# Patient Record
Sex: FEMALE | Race: WHITE | NOT HISPANIC OR LATINO | Employment: STUDENT
[De-identification: names, ages, dates, MRNs, and addresses within clinical notes are randomized per-mention and may not be internally consistent; named-entity substitution may affect disease eponyms.]

---
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
## 2024-11-14 ENCOUNTER — OFFICE VISIT (OUTPATIENT)
Dept: ALLERGY | Facility: CLINIC | Age: 24
End: 2024-11-14
Payer: COMMERCIAL

## 2024-11-14 VITALS
BODY MASS INDEX: 19.8 KG/M2 | OXYGEN SATURATION: 98 % | WEIGHT: 111.77 LBS | HEART RATE: 92 BPM | DIASTOLIC BLOOD PRESSURE: 79 MMHG | SYSTOLIC BLOOD PRESSURE: 111 MMHG

## 2024-11-14 DIAGNOSIS — L20.84 INTRINSIC ECZEMA: Primary | ICD-10-CM

## 2024-11-14 DIAGNOSIS — R09.89 RUNNY NOSE: ICD-10-CM

## 2024-11-14 DIAGNOSIS — H57.9 PRURITUS OF BOTH EYES: ICD-10-CM

## 2024-11-14 DIAGNOSIS — J30.1 SEASONAL ALLERGIC RHINITIS DUE TO POLLEN: ICD-10-CM

## 2024-11-14 PROCEDURE — 95004 PERQ TESTS W/ALRGNC XTRCS: CPT | Performed by: INTERNAL MEDICINE

## 2024-11-14 PROCEDURE — 99204 OFFICE O/P NEW MOD 45 MIN: CPT | Performed by: INTERNAL MEDICINE

## 2024-11-14 NOTE — LETTER
2024    Eve Jones   2000        To Whom it May Concern;    Please excuse Eve Jones from the use of skin related products/procedures due to her underlying skin condition.  Currently evaluating to try to minimize inflammation to allow her to tolerated topical products in the future.    Sincerely,        Arya Valente MD

## 2024-11-14 NOTE — PATIENT INSTRUCTIONS
Dermatology referral  Zyrtec 20 mg at night  Consider Flonase nasal spray as needed  Pataday eyedrops as needed  Allergy shots if not improving    ECZEMA/ATOPIC DERMATITIS TREATMENT INSTRUCTIONS    Moisturizing:    This is the first and most important step.  Thick moisturizing creams or greasy ointments work best: Vanicream, Cerave, Cetaphil, Vaseline, Eucerin, Aquaphor, etc.   Apply a liberal layer to entire body at least twice a day or up to several times per day when the air is dry (as in late fall, winter, early spring)   If using steroid ointments, apply these first before applying moisturizer over the steroid ointment.    Bathing:     Bathe DAILY.   Use lukewarm water and soak for 10-20 minutes   Do not add bubble bath or bath oils/salts to the bath water   Use as little soap as possible, and only very mild/gentle soaps.  Wash dirty areas with soap at the end of the bath and quickly rinse off before getting out of the tub   Gently pat the skin dry leaving it mostly damp. Immediately apply moisturizer to the skin while it is still damp. If applying steroid cream, apply this first followed by the moisturizer.    Bleach Baths:   Use lukewarm water   If there are areas of scratched/broken skin, add 1/2 cup of bleach (chlorox, etc) to a full tub of bath water. Add 1/4 cup to a half-full tub of bath water. And add 1 tablespoon of bleach for a smaller infant tub full of water.   Soak in lukewarm bleach and water mixture for 10-15 minutes   Pat dry gently, and immediately apply moisturizer or follow your usual skin care routine while the skin is still damp    Steroid creams/ointments: will avoid at this time    Wet Wraps:    Use this intensive treatment if the eczema has a severe flare up or is more difficult to treat.   Apply your steroid cream/ointment to the area of rash   Then apply a liberal layer of moisturizer over the entire area to be wrapped.   Soak a soft cotton sock, t-shirt, or soft cloth in water. Wring  "out so it is still damp.   Wrap the damp cloth around the area being treated   Wrap a dry cloth or sock over the wet one (not too tight).   Put on long PJ's or clothing over the wraps   Leave the wrap in place for at least 30 minutes or overnight    Antihistamines:    Can help reduce itching and scratching   Zyrtec 20 mg at bedtime    Avoidance Measures:    Avoid exposure to things that make eczema worse including:   Allergens such as dust mites, animals, and pollens   Irritants such as cigarette smoke or long, hot showers or baths   Fabrics: Avoid synthetic fabrics and those that are rough or scratchy. Try to use breathable, natural fabrics such as cotton   Harsh soaps or detergents   Any skin care products that have scents/perfumes or dyes    Natural Remedies:   Some natural products can be helpful in some cases. You can use products such as coconut oil to moisturize the skin. Use a product that is \"virgin\" or \"cold pressed\" to avoid the addition of other chemicals.   Be aware that products that may be labeled as \"natural,\" \"organic,\" or \"contains essential oils\" can still be irritating to many people with eczema and may need to be avoided.     "

## 2024-11-14 NOTE — PROGRESS NOTES
Per provider verbal order, placed Adult Environmental Panel scratch test.  Verbal consent was obtained by MD prior to procedure.  Once panels were placed, patient was monitored for 15 minutes in clinic.  Provider read test after 15 minutes.  Pt tolerated procedure well.  All questions and concerns were addressed at office visit.      Krystal Babcock RN

## 2024-11-14 NOTE — LETTER
11/14/2024      Eve Jones  [address] [facility] [address]      Dear Colleague,    Thank you for referring your patient, Eve Jones, to the [facility] [place]. Please see a copy of my visit note below.    Eve Jones was seen in the Allergy Clinic at [facility].    Eve Jones is a 24 year old female being seen today in consultation for eczema.    She has problems with her skin her entire life and has seen dermatology at least a couple different occasions.  The note that I am able to review is from 2022.  She is described to me that she she had problems with topical steroids, causing a topical steroid withdrawal.  She is hesitant to use those again.    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    Her skin is quite itchy.  She is wondering if she has a food allergy.  Cats and dogs seems to make her eczema worse.  She also has a runny nose, eye itching, eye crusting as well as sneezing.    She has tried multiple different topical lotions and creams and all of them seem to make her skin worse or at least are irritating to her skin.    Past Medical History:   Diagnosis Date     Anxiety      Depression      Family History   Problem Relation Age of Onset     No Known Problems Mother      No Known Problems Father      No Known Problems Sister      No Known Problems Brother      No Known Problems Maternal Grandmother      No Known Problems Maternal Grandfather      No Known Problems Paternal Grandmother      No Known Problems Other      Past Surgical History:   Procedure Laterality Date     AS CLOSURE SUPERFICIAL, WOUND DEHIS SIMPLE      face age 6     NO HISTORY OF SURGERY       PARTIAL HYMENECTOMY/REVISION HYMENAL RING N/A 10/7/2020    Procedure: EXAM UNDER ANESTHESIA, PARTIAL HYMENECTOMY;  Surgeon: Carine Gastelum MD;   Location: [facility] OR       ENVIRONMENTAL HISTORY:   Pets inside the house include 1 cat(s).  Do you smoke cigarettes or other recreational drugs? No There is/are 0 smokers living in the house. The house does not have a damp basement.     SOCIAL HISTORY:   Eve is student. She lives with her self.      Review of Systems      Current Outpatient Medications:      amphetamine-dextroamphetamine (ADDERALL) 5 MG tablet, Take 5 mg by mouth every morning , Disp: , Rfl:      betamethasone dipropionate (DIPROSONE) 0.05 % external ointment, Apply topically 2 times daily , 2 weeks on, 2 weeks off. (Patient not taking: Reported on 11/14/2024), Disp: 50 g, Rfl: 3     guanFACINE (INTUNIV) 2 MG TB24 24 hr tablet, Take 2 mg by mouth every morning  (Patient not taking: Reported on 11/14/2024), Disp: , Rfl:      levonorgest-eth estrad 91-Day (SEASONIQUE) 0.15-0.03 &0.01 MG tablet, Take 1 tablet by mouth daily (Patient not taking: Reported on 11/14/2024), Disp: 112 tablet, Rfl: 4     sertraline (ZOLOFT) 100 MG tablet, Take 100 mg by mouth every morning  (Patient not taking: Reported on 11/14/2024), Disp: , Rfl:   No Known Allergies      EXAM:   /79   Pulse 92   Wt 50.7 kg (111 lb 12.4 oz)   SpO2 98%   BMI 19.80 kg/m      Physical Exam    Constitutional:       General: She is not in acute distress.     Appearance: Normal appearance. She is not ill-appearing.   HENT:      Head: Normocephalic and atraumatic.      Nose: Nose normal. No congestion or rhinorrhea.      Mouth/Throat:      Mouth: Mucous membranes are moist.      Pharynx: Oropharynx is clear. No posterior oropharyngeal erythema.   Eyes:      General:         Right eye: No discharge.         Left eye: No discharge.   Cardiovascular:      Rate and Rhythm: Normal rate and regular rhythm.      Heart sounds: Normal heart sounds.   Pulmonary:      Effort: Pulmonary effort is normal.      Breath sounds: Normal breath sounds. No wheezing or rhonchi.   Skin:     General: Skin is  warm.      Findings: She has an erosion on her upper back she has multiple excoriations on bilateral arms significant eczematous changes on bilateral wrists and she has facial erythema with minimal scaling  Neurological:      General: No focal deficit present.      Mental Status: She is alert. Mental status is at baseline.   Psychiatric:         Mood and Affect: Mood normal.         Behavior: Behavior normal.      WORKUP: Skin testing was positive to cat and dog and trees and grass and weeds.    ENVIRONMENTAL PERCUTANEOUS SKIN TESTING: ADULT      11/14/2024    10:00 AM   [place] [facility]   Consent Y   Ordering Physician Dr. Valente   Interpreting Physician Dr. Valente   Testing Technician Krystal BARNES, LINDSEY   Location Back   Time start: 10:03   Time End: 10:18   Positive Control: Histatrol*ALK 1 mg/ml 5/20   Negative Control: 50% Glycerin 0   Cat Hair*ALK (10,000 BAU/ml) 5/20   AP Dog Hair/Dander (1:100 w/v) 8/25   Dust Mite p. 30,000 AU/ml 0   Dust Mite f. (30,000 AU/ml) 0   Dylan (W/F in millimeters) 0   Dilshad Grass (100,000 BAU/mL) 15/30   Red [place] (W/F in millimeters) 5/15   Maple/[place] (W/F in millimeters) 0   Hackberry (W/F in millimeters) 5/15   [place] (W/F in millimeters) 0   [place] *ALK (W/F in millimeters) 0   American Elm (W/F in millimeters) 3/5   [place] (W/F in millimeters) 5/20   Black [place] (W/F in millimeters) 0   Birch Mix (W/F in millimeters) 10/30   [place] (W/F in millimeters) 0   Oak (W/F in millimeters) 8/20   Cocklebur (W/F in millimeters) 3/10   [place] (W/F in millimeters) 5/15   White Vimal (W/F in millimeters) 10/30   Careless (W/F in millimeters) 0   Nettle (W/F in millimeters) 0   English Plantain (W/F in millimeters) 0   Kochia (W/F in millimeters) 3/5   Lamb's Quarter (W/F in millimeters) 3/5   Marshelder (W/F in millimeters) 0   Ragweed Mix* ALK (W/F in millimeters) 7/30   Russian Thistle (W/F in millimeters) 0   Sagebrush/Mugwort (W/F in millimeters) 0   Sheep Sorrel  (W/F in millimeters) 0   Feather Mix* ALK (W/F in millimeters) 0   Penicillium Mix (1:10 w/v) 0   Curvularia spicifera (1:10 w/v) 0   Epicoccum (1:10 w/v) 0   Aspergillus fumigatus (1:10 w/v): 0   Alternaria tenius (1:10 w/v) 0   H. Cladosporium (1:10 w/v) 0   Phoma herbarum (1:10 w/v) 0         ASSESSMENT/PLAN:  Eve Jones is a 24 year old female seen today for eczema concerns and also has symptoms consistent with allergic rhinitis with positive skin test.  We discussed different treatment options.  Eczema care was focused on which is her primary concern.  Will also refer to dermatology due to her concerns of using topical steroids.    Dermatology referral  Zyrtec 20 mg at night  Consider Flonase nasal spray as needed  Pataday eyedrops as needed  Allergy shots if not improving  Eczema care guidelines and instructions were provided in the after visit summary.    Follow-up in 1 month      Thank you for allowing me to participate in the care of Eve Jones.      I spent 45 minutes on the date of the encounter doing chart review, history and exam, documentation and further coordination as noted above exclusive of separately reported interpretations    Arya Valente MD  Allergy/Immunology  [facility]      Per provider verbal order, placed Adult Environmental Panel scratch test.  Verbal consent was obtained by MD prior to procedure.  Once panels were placed, patient was monitored for 15 minutes in clinic.  Provider read test after 15 minutes.  Pt tolerated procedure well.  All questions and concerns were addressed at office visit.      Krystal Babcock RN      Again, thank you for allowing me to participate in the care of your patient.        Sincerely,        Arya Valente MD

## 2024-11-14 NOTE — PROGRESS NOTES
Eve Jones was seen in the Allergy Clinic at [facility].    Eve Jones is a 24 year old female being seen today in consultation for eczema.    She has problems with her skin her entire life and has seen dermatology at least a couple different occasions.  The note that I am able to review is from 2022.  She is described to me that she she had problems with topical steroids, causing a topical steroid withdrawal.  She is hesitant to use those again.    She is in cosmetology school at [employer] and would like a note so she does not have to apply some of the topical products that they are asking her to use on her own skin.    Her skin is quite itchy.  She is wondering if she has a food allergy.  Cats and dogs seems to make her eczema worse.  She also has a runny nose, eye itching, eye crusting as well as sneezing.    She has tried multiple different topical lotions and creams and all of them seem to make her skin worse or at least are irritating to her skin.    Past Medical History:   Diagnosis Date    Anxiety     Depression      Family History   Problem Relation Age of Onset    No Known Problems Mother     No Known Problems Father     No Known Problems Sister     No Known Problems Brother     No Known Problems Maternal Grandmother     No Known Problems Maternal Grandfather     No Known Problems Paternal Grandmother     No Known Problems Other      Past Surgical History:   Procedure Laterality Date    AS CLOSURE SUPERFICIAL, WOUND DEHIS SIMPLE      face age 6    NO HISTORY OF SURGERY      PARTIAL HYMENECTOMY/REVISION HYMENAL RING N/A 10/7/2020    Procedure: EXAM UNDER ANESTHESIA, PARTIAL HYMENECTOMY;  Surgeon: Carine Gastelum MD;  Location:  OR       ENVIRONMENTAL HISTORY:   Pets inside the house include 1 cat(s).  Do you smoke cigarettes or other recreational drugs? No There is/are 0 smokers living in the house. The house does not have a damp basement.     SOCIAL HISTORY:    Eve is student. She lives with her self.      Review of Systems      Current Outpatient Medications:     amphetamine-dextroamphetamine (ADDERALL) 5 MG tablet, Take 5 mg by mouth every morning , Disp: , Rfl:     betamethasone dipropionate (DIPROSONE) 0.05 % external ointment, Apply topically 2 times daily , 2 weeks on, 2 weeks off. (Patient not taking: Reported on 11/14/2024), Disp: 50 g, Rfl: 3    guanFACINE (INTUNIV) 2 MG TB24 24 hr tablet, Take 2 mg by mouth every morning  (Patient not taking: Reported on 11/14/2024), Disp: , Rfl:     levonorgest-eth estrad 91-Day (SEASONIQUE) 0.15-0.03 &0.01 MG tablet, Take 1 tablet by mouth daily (Patient not taking: Reported on 11/14/2024), Disp: 112 tablet, Rfl: 4    sertraline (ZOLOFT) 100 MG tablet, Take 100 mg by mouth every morning  (Patient not taking: Reported on 11/14/2024), Disp: , Rfl:   No Known Allergies      EXAM:   /79   Pulse 92   Wt 50.7 kg (111 lb 12.4 oz)   SpO2 98%   BMI 19.80 kg/m      Physical Exam    Constitutional:       General: She is not in acute distress.     Appearance: Normal appearance. She is not ill-appearing.   HENT:      Head: Normocephalic and atraumatic.      Nose: Nose normal. No congestion or rhinorrhea.      Mouth/Throat:      Mouth: Mucous membranes are moist.      Pharynx: Oropharynx is clear. No posterior oropharyngeal erythema.   Eyes:      General:         Right eye: No discharge.         Left eye: No discharge.   Cardiovascular:      Rate and Rhythm: Normal rate and regular rhythm.      Heart sounds: Normal heart sounds.   Pulmonary:      Effort: Pulmonary effort is normal.      Breath sounds: Normal breath sounds. No wheezing or rhonchi.   Skin:     General: Skin is warm.      Findings: She has an erosion on her upper back she has multiple excoriations on bilateral arms significant eczematous changes on bilateral wrists and she has facial erythema with minimal scaling  Neurological:      General: No focal deficit  present.      Mental Status: She is alert. Mental status is at baseline.   Psychiatric:         Mood and Affect: Mood normal.         Behavior: Behavior normal.      WORKUP: Skin testing was positive to cat and dog and trees and grass and weeds.    ENVIRONMENTAL PERCUTANEOUS SKIN TESTING: ADULT      11/14/2024    10:00 AM   [place] [facility]   Consent Y   Ordering Physician Dr. Valente   Interpreting Physician Dr. Valente   Testing Technician Krystal BARNES RN   Location Back   Time start: 10:03   Time End: 10:18   Positive Control: Histatrol*ALK 1 mg/ml 5/20   Negative Control: 50% Glycerin 0   Cat Hair*ALK (10,000 BAU/ml) 5/20   AP Dog Hair/Dander (1:100 w/v) 8/25   Dust Mite p. 30,000 AU/ml 0   Dust Mite f. (30,000 AU/ml) 0   Dylan (W/F in millimeters) 0   Dilshad Grass (100,000 BAU/mL) 15/30   Red [place] (W/F in millimeters) 5/15   Maple/[place] (W/F in millimeters) 0   [place]berry (W/F in millimeters) 5/15   [place] (W/F in millimeters) 0   [place] *ALK (W/F in millimeters) 0   American Elm (W/F in millimeters) 3/5   [place] (W/F in millimeters) 5/20   Black [place] (W/F in millimeters) 0   Birch Mix (W/F in millimeters) 10/30   [place] (W/F in millimeters) 0   Oak (W/F in millimeters) 8/20   Cocklebur (W/F in millimeters) 3/10   [place] (W/F in millimeters) 5/15   White Vimal (W/F in millimeters) 10/30   Careless (W/F in millimeters) 0   Nettle (W/F in millimeters) 0   English Plantain (W/F in millimeters) 0   Kochia (W/F in millimeters) 3/5   Lamb's Quarter (W/F in millimeters) 3/5   Marshelder (W/F in millimeters) 0   Ragweed Mix* ALK (W/F in millimeters) 7/30   Russian Thistle (W/F in millimeters) 0   Sagebrush/Mugwort (W/F in millimeters) 0   Sheep Sorrel (W/F in millimeters) 0   Feather Mix* ALK (W/F in millimeters) 0   Penicillium Mix (1:10 w/v) 0   Curvularia spicifera (1:10 w/v) 0   Epicoccum (1:10 w/v) 0   Aspergillus fumigatus (1:10 w/v): 0   Alternaria tenius (1:10 w/v) 0   H. Cladosporium (1:10  w/v) 0   Phoma herbarum (1:10 w/v) 0         ASSESSMENT/PLAN:  Eve Jones is a 24 year old female seen today for eczema concerns and also has symptoms consistent with allergic rhinitis with positive skin test.  We discussed different treatment options.  Eczema care was focused on which is her primary concern.  Will also refer to dermatology due to her concerns of using topical steroids.    Dermatology referral  Zyrtec 20 mg at night  Consider Flonase nasal spray as needed  Pataday eyedrops as needed  Allergy shots if not improving  Eczema care guidelines and instructions were provided in the after visit summary.    Follow-up in 1 month      Thank you for allowing me to participate in the care of Eve Jones.      I spent 45 minutes on the date of the encounter doing chart review, history and exam, documentation and further coordination as noted above exclusive of separately reported interpretations    Arya Valente MD  Allergy/Immunology  [facility]

## 2025-01-02 ENCOUNTER — OFFICE VISIT (OUTPATIENT)
Dept: ALLERGY | Facility: CLINIC | Age: 25
End: 2025-01-02
Attending: INTERNAL MEDICINE
Payer: COMMERCIAL

## 2025-01-02 VITALS
BODY MASS INDEX: 19.8 KG/M2 | HEART RATE: 118 BPM | DIASTOLIC BLOOD PRESSURE: 73 MMHG | SYSTOLIC BLOOD PRESSURE: 103 MMHG | WEIGHT: 111.77 LBS | OXYGEN SATURATION: 100 %

## 2025-01-02 DIAGNOSIS — R09.89 RUNNY NOSE: ICD-10-CM

## 2025-01-02 DIAGNOSIS — J30.1 SEASONAL ALLERGIC RHINITIS DUE TO POLLEN: ICD-10-CM

## 2025-01-02 DIAGNOSIS — L20.84 INTRINSIC ECZEMA: ICD-10-CM

## 2025-01-02 DIAGNOSIS — H57.9 PRURITUS OF BOTH EYES: ICD-10-CM

## 2025-01-02 RX ORDER — PIMECROLIMUS 10 MG/G
CREAM TOPICAL 2 TIMES DAILY
Qty: 100 G | Refills: 5 | Status: SHIPPED | OUTPATIENT
Start: 2025-01-02

## 2025-01-02 RX ORDER — FEXOFENADINE HCL 60 MG/1
60 TABLET, FILM COATED ORAL 2 TIMES DAILY
COMMUNITY

## 2025-01-02 NOTE — PROGRESS NOTES
Eve Jones was seen in the Allergy Clinic at [facility].    Eve Jones is a 24 year old female being seen today for ongoing evaluation of Intrinsic eczema.    Since the last visit the patient has been having feeling better.     Since the weather has cooled off her skin is significantly improved.  At the last appointment testing did reveal a cat, dog, tree, grass and weed allergy.  Skin testing was performed.    She switch Zyrtec to Allegra.  Not using the Flonase or Pataday at this time.  We also provided an eczema care handout at the last appointment.  She does have an upcoming dermatology appointment in April.    PAST ALLERGY HISTORY:    She has problems with her skin her entire life and has seen dermatology in the past, last I see is 2022.  She she had problems with topical steroids, causing a topical steroid withdrawal.  She is hesitant to use those again.    She is in cosmetology school at [employer] and would like a note so she does not have to apply some of the topical products, which aggravate her skin.    Her skin is quite itchy.  Cats and dogs seems to make her eczema worse.  She also has a runny nose, eye itching, eye crusting as well as sneezing.    She has tried multiple different topical lotions and creams and all of them seem to make her skin worse or at least are irritating to her skin.    Past Medical History:   Diagnosis Date    Anxiety     Depression      Family History   Problem Relation Age of Onset    No Known Problems Mother     No Known Problems Father     No Known Problems Sister     No Known Problems Brother     No Known Problems Maternal Grandmother     No Known Problems Maternal Grandfather     No Known Problems Paternal Grandmother     No Known Problems Other      Past Surgical History:   Procedure Laterality Date    AS CLOSURE SUPERFICIAL, WOUND DEHIS SIMPLE      face age 6    NO HISTORY OF SURGERY      PARTIAL HYMENECTOMY/REVISION HYMENAL RING N/A 10/7/2020     Procedure: EXAM UNDER ANESTHESIA, PARTIAL HYMENECTOMY;  Surgeon: Carine Gastelum MD;  Location:  OR         Current Outpatient Medications:     amphetamine-dextroamphetamine (ADDERALL) 5 MG tablet, Take 5 mg by mouth every morning , Disp: , Rfl:     fexofenadine (ALLEGRA) 60 MG tablet, Take 60 mg by mouth 2 times daily., Disp: , Rfl:     pimecrolimus (ELIDEL) 1 % external cream, Apply topically 2 times daily., Disp: 100 g, Rfl: 5    betamethasone dipropionate (DIPROSONE) 0.05 % external ointment, Apply topically 2 times daily , 2 weeks on, 2 weeks off. (Patient not taking: Reported on 1/2/2025), Disp: 50 g, Rfl: 3    guanFACINE (INTUNIV) 2 MG TB24 24 hr tablet, Take 2 mg by mouth every morning  (Patient not taking: Reported on 1/2/2025), Disp: , Rfl:     levonorgest-eth estrad 91-Day (SEASONIQUE) 0.15-0.03 &0.01 MG tablet, Take 1 tablet by mouth daily (Patient not taking: Reported on 1/2/2025), Disp: 112 tablet, Rfl: 4    sertraline (ZOLOFT) 100 MG tablet, Take 100 mg by mouth every morning  (Patient not taking: Reported on 1/2/2025), Disp: , Rfl:   No Known Allergies      EXAM:   /73   Pulse 118   Wt 50.7 kg (111 lb 12.4 oz)   SpO2 100%   BMI 19.80 kg/m      Constitutional:       General: She is not in acute distress.     Appearance: Normal appearance. She is not ill-appearing.   HENT:      Head: Normocephalic and atraumatic.     Eyes:      General:         Right eye: No discharge.         Left eye: No discharge.   Skin:     Findings: She does have some lichenification and erythema bilateral wrist.  Near her nasolabial fold she does have some slight scaling without any erythema  Neurological:      General: No focal deficit present.      Mental Status: She is alert. Mental status is at baseline.   Psychiatric:         Mood and Affect: Mood normal.         Behavior: Behavior normal.        ASSESSMENT/PLAN:  Eve Jones is a 24 year old female seen today for ongoing evaluation of eczema.   She will be seeing dermatology in April.  We discussed different topical options such as a Sunil 2 inhibitor, calcineurin inhibitor, or Eucrisa.    Will start with the topical calcineurin inhibitor, Elidel.    We did discuss allergy shots today which will not be started which would be primarily for her allergy symptoms, but may help her eczema.    Her skin is doing well at this time.    Follow-up in May.      Thank you for allowing me to participate in the care of Eve GUS Karen.      I spent 30 minutes on the date of the encounter doing chart review, history and exam, documentation and further coordination as noted above exclusive of separately reported interpretations    Arya Valente MD  Allergy/Immunology  [facility]

## 2025-01-02 NOTE — LETTER
1/2/2025      Eve Jones  [address]  [facility] [address]      Dear Colleague,    Thank you for referring your patient, Eve Jones, to the [facility] [place]. Please see a copy of my visit note below.    Eve Jones was seen in the Allergy Clinic at [facility].    Eve Jones is a 24 year old female being seen today for ongoing evaluation of Intrinsic eczema.    Since the last visit the patient has been having feeling better.     Since the weather has cooled off her skin is significantly improved.  At the last appointment testing did reveal a cat, dog, tree, grass and weed allergy.  Skin testing was performed.    She switch Zyrtec to Allegra.  Not using the Flonase or Pataday at this time.  We also provided an eczema care handout at the last appointment.  She does have an upcoming dermatology appointment in April.    PAST ALLERGY HISTORY:    She has problems with her skin her entire life and has seen dermatology in the past, last I see is 2022.  She she had problems with topical steroids, causing a topical steroid withdrawal.  She is hesitant to use those again.    She is in cosmetology school at [employer] and would like a note so she does not have to apply some of the topical products, which aggravate her skin.    Her skin is quite itchy.  Cats and dogs seems to make her eczema worse.  She also has a runny nose, eye itching, eye crusting as well as sneezing.    She has tried multiple different topical lotions and creams and all of them seem to make her skin worse or at least are irritating to her skin.    Past Medical History:   Diagnosis Date     Anxiety      Depression      Family History   Problem Relation Age of Onset     No Known Problems Mother      No Known Problems Father      No Known Problems Sister      No Known Problems Brother      No Known Problems Maternal Grandmother      No Known Problems Maternal Grandfather      No Known Problems  Paternal Grandmother      No Known Problems Other      Past Surgical History:   Procedure Laterality Date     AS CLOSURE SUPERFICIAL, WOUND DEHIS SIMPLE      face age 6     NO HISTORY OF SURGERY       PARTIAL HYMENECTOMY/REVISION HYMENAL RING N/A 10/7/2020    Procedure: EXAM UNDER ANESTHESIA, PARTIAL HYMENECTOMY;  Surgeon: Carine Gastelum MD;  Location:  OR         Current Outpatient Medications:      amphetamine-dextroamphetamine (ADDERALL) 5 MG tablet, Take 5 mg by mouth every morning , Disp: , Rfl:      fexofenadine (ALLEGRA) 60 MG tablet, Take 60 mg by mouth 2 times daily., Disp: , Rfl:      pimecrolimus (ELIDEL) 1 % external cream, Apply topically 2 times daily., Disp: 100 g, Rfl: 5     betamethasone dipropionate (DIPROSONE) 0.05 % external ointment, Apply topically 2 times daily , 2 weeks on, 2 weeks off. (Patient not taking: Reported on 1/2/2025), Disp: 50 g, Rfl: 3     guanFACINE (INTUNIV) 2 MG TB24 24 hr tablet, Take 2 mg by mouth every morning  (Patient not taking: Reported on 1/2/2025), Disp: , Rfl:      levonorgest-eth estrad 91-Day (SEASONIQUE) 0.15-0.03 &0.01 MG tablet, Take 1 tablet by mouth daily (Patient not taking: Reported on 1/2/2025), Disp: 112 tablet, Rfl: 4     sertraline (ZOLOFT) 100 MG tablet, Take 100 mg by mouth every morning  (Patient not taking: Reported on 1/2/2025), Disp: , Rfl:   No Known Allergies      EXAM:   /73   Pulse 118   Wt 50.7 kg (111 lb 12.4 oz)   SpO2 100%   BMI 19.80 kg/m      Constitutional:       General: She is not in acute distress.     Appearance: Normal appearance. She is not ill-appearing.   HENT:      Head: Normocephalic and atraumatic.     Eyes:      General:         Right eye: No discharge.         Left eye: No discharge.   Skin:     Findings: She does have some lichenification and erythema bilateral wrist.  Near her nasolabial fold she does have some slight scaling without any erythema  Neurological:      General: No focal deficit  present.      Mental Status: She is alert. Mental status is at baseline.   Psychiatric:         Mood and Affect: Mood normal.         Behavior: Behavior normal.        ASSESSMENT/PLAN:  Eve Jones is a 24 year old female seen today for ongoing evaluation of eczema.  She will be seeing dermatology in April.  We discussed different topical options such as a Usnil 2 inhibitor, calcineurin inhibitor, or Eucrisa.    Will start with the topical calcineurin inhibitor, Elidel.    We did discuss allergy shots today which will not be started which would be primarily for her allergy symptoms, but may help her eczema.    Her skin is doing well at this time.    Follow-up in May.      Thank you for allowing me to participate in the care of Eve Jones.      I spent 30 minutes on the date of the encounter doing chart review, history and exam, documentation and further coordination as noted above exclusive of separately reported interpretations    Arya Valente MD  Allergy/Immunology  [facility]      Again, thank you for allowing me to participate in the care of your patient.        Sincerely,        Arya Valente MD    Electronically signed

## 2025-04-17 ENCOUNTER — OFFICE VISIT (OUTPATIENT)
Dept: DERMATOLOGY | Facility: CLINIC | Age: 25
End: 2025-04-17
Attending: PHYSICIAN ASSISTANT
Payer: COMMERCIAL

## 2025-04-17 DIAGNOSIS — L20.84 INTRINSIC ECZEMA: Primary | ICD-10-CM

## 2025-04-17 DIAGNOSIS — L20.89 OTHER ATOPIC DERMATITIS: ICD-10-CM

## 2025-04-17 RX ORDER — PIMECROLIMUS 10 MG/G
CREAM TOPICAL 2 TIMES DAILY
Qty: 100 G | Refills: 5 | Status: SHIPPED | OUTPATIENT
Start: 2025-04-17

## 2025-04-17 ASSESSMENT — PAIN SCALES - GENERAL: PAINLEVEL_OUTOF10: NO PAIN (0)

## 2025-04-17 NOTE — LETTER
4/17/2025      Eve Jones  [address]  [facility] [address]      Dear Colleague,    Thank you for referring your patient, Eve Jones, to the [facility] [place]. Please see a copy of my visit note below.    [facility] Dermatology Note  Encounter Date: Apr 17, 2025  Office Visit      Dermatology Problem List:    Atopic dermatitis  - Tx: Dupixent. & Elidel Cream     PMHx: allergic rhinitis - numerous food allergies, cat/dog allergy, tree and grass allergy  Social: adopted from [place] as a baby  ____________________________________________    Assessment & Plan:  # Atopic Dermatitis, chronic, flaring. Will start Dupixent given chronicity and lack of improvement to various topicals over time.   - Discussed the etiology of this condition as well as treatment and their side effects  - Discussed benefits and risks of medication including but not limited to risk of Headache, HSV infection, oral herpes, allergic reaction, local injection site reaction, nasopharyngitis/URI/bronchitis,  (cold symptoms) conjunctivitis, diarrhea, atopic derm worsening, flu, back pain, eye pruritus and/or case reports/rare cases of: Eosinophilia (transient), hypersensitivity reaction, keratitis, serum sickness, serum sickness-like reaction, ligament rupture, SCC,  urticaria, xerophthalmia, lymphoma, prostate cancer.  The patient was counseled to hold dose if he/she feels ill and to contact clinic. Vaccinations reviewed. The patient will not get live vaccination. Flu vaccination recommended.  The patient denies history of hypersensitivity of dupilumab.   - Patient is not currently pregnant or breastfeeding.   - Before injection, remove dupilumab pre-filled syringe from the refrigerator and allow dupilumab to reach room temperature (45 minutes) without removing the needle cap.   - Start Dupilumab 600 mg once (given as two 300 mg injections), followed by 300 mg once every other week.   -  Refilled her Elidel cream. Ok to use BID to flaring patches. Reviewed black box warning.   - Please start a daily moisturizer to your skin. Some good examples are: Cerave Cetaphil, Vanicream, Vaseline, Aquaphor. Recommended products IN A JAR. It is best to put it on after you get out of the shower.  - Follow up in 6 months to check for any abnormalities.     Procedures Performed:   None     Follow-up: 6 month(s) in-person, or earlier for new or changing lesions    Staff and scribe:     Scribe Disclosure:   I, ADARSH SEGOVIA, am serving as a scribe; to document services personally performed by Aileen Billy PA-C -based on data collection and the provider's statements to me.     Provider Disclosure:  I agree with above History, Review of Systems, Physical exam and Plan.  I have reviewed the content of the documentation and have edited it as needed. I have personally performed the services documented here and the documentation accurately represents those services and the decisions I have made.      Electronically signed by:    All risks, benefits and alternatives were discussed with patient.  Patient is in agreement and understands the assessment and plan.  All questions were answered.    Aileen Billy PA-C, MPAS  [facility] [place]: Phone: [phone], Fax: [phone]  [facility]: Phone: [phone],  Fax: [phone]  [facility]: Phone: [phone], Fax: [phone]  ____________________________________________    CC: Eczema (Dry sensitive skin)      Reviewed patients past medical history and pertinent chart review prior to patient's visit today.     HPI:  Ms. Eve Jones is a 24 year old female who presents today as a new patient for dermatitis consultation. Noted she has had eczema her whole life. In the past she has used Elidel cream and betamethasone. Has noticed her skin is dry, itchy, and  sensitive. Patient has really struggled with this. Reports it has dramatically affected her life. She does not do the same things as her peers as she knows her skin will react and she will be miserable for long periods of time. It has affected her mental and social health. Itchy. Has hx of allergic rhinitis as well. Numerous allergies to foods as well as dogs and cats and environmental allergies.     Patient is otherwise feeling well, without additional concerns.    Labs:  N/A    Physical Exam:  Vitals: There were no vitals taken for this visit.  SKIN: Focused examination of areas noted below was performed.   - There are pink scaly patches and plaques on the antecubital fossae, neck.  - No other lesions of concern on areas examined.     Medications:  Current Outpatient Medications   Medication Sig Dispense Refill     amphetamine-dextroamphetamine (ADDERALL) 5 MG tablet Take 5 mg by mouth every morning        fexofenadine (ALLEGRA) 60 MG tablet Take 60 mg by mouth 2 times daily.       betamethasone dipropionate (DIPROSONE) 0.05 % external ointment Apply topically 2 times daily , 2 weeks on, 2 weeks off. (Patient not taking: Reported on 11/14/2024) 50 g 3     guanFACINE (INTUNIV) 2 MG TB24 24 hr tablet Take 2 mg by mouth every morning  (Patient not taking: Reported on 11/14/2024)       levonorgest-eth estrad 91-Day (SEASONIQUE) 0.15-0.03 &0.01 MG tablet Take 1 tablet by mouth daily (Patient not taking: Reported on 11/14/2024) 112 tablet 4     pimecrolimus (ELIDEL) 1 % external cream Apply topically 2 times daily. (Patient not taking: Reported on 4/17/2025) 100 g 5     sertraline (ZOLOFT) 100 MG tablet Take 100 mg by mouth every morning  (Patient not taking: Reported on 11/14/2024)       No current facility-administered medications for this visit.      Past Medical/Surgical History:   Patient Active Problem List   Diagnosis     ADHD (attention deficit hyperactivity disorder)     Eczema     intl adoption from [place] age  14 mo     Hymen abnormality     Past Medical History:   Diagnosis Date     Anxiety      Depression                           Again, thank you for allowing me to participate in the care of your patient.        Sincerely,        Aileen Billy PA-C    Electronically signed

## 2025-04-17 NOTE — PROGRESS NOTES
[facility] Dermatology Note  Encounter Date: Apr 17, 2025  Office Visit      Dermatology Problem List:    Atopic dermatitis  - Tx: Dupixent. & Elidel Cream     PMHx: allergic rhinitis - numerous food allergies, cat/dog allergy, tree and grass allergy  Social: adopted from [place] as a baby  ____________________________________________    Assessment & Plan:  # Atopic Dermatitis, chronic, flaring. Will start Dupixent given chronicity and lack of improvement to various topicals over time.   - Discussed the etiology of this condition as well as treatment and their side effects  - Discussed benefits and risks of medication including but not limited to risk of Headache, HSV infection, oral herpes, allergic reaction, local injection site reaction, nasopharyngitis/URI/bronchitis,  (cold symptoms) conjunctivitis, diarrhea, atopic derm worsening, flu, back pain, eye pruritus and/or case reports/rare cases of: Eosinophilia (transient), hypersensitivity reaction, keratitis, serum sickness, serum sickness-like reaction, ligament rupture, SCC,  urticaria, xerophthalmia, lymphoma, prostate cancer.  The patient was counseled to hold dose if he/she feels ill and to contact clinic. Vaccinations reviewed. The patient will not get live vaccination. Flu vaccination recommended.  The patient denies history of hypersensitivity of dupilumab.   - Patient is not currently pregnant or breastfeeding.   - Before injection, remove dupilumab pre-filled syringe from the refrigerator and allow dupilumab to reach room temperature (45 minutes) without removing the needle cap.   - Start Dupilumab 600 mg once (given as two 300 mg injections), followed by 300 mg once every other week.   - Refilled her Elidel cream. Ok to use BID to flaring patches. Reviewed black box warning.   - Please start a daily moisturizer to your skin. Some good examples are: Cerave Cetaphil, Vanicream, Vaseline, Aquaphor. Recommended products IN A JAR. It is  best to put it on after you get out of the shower.  - Follow up in 6 months to check for any abnormalities.     Procedures Performed:   None     Follow-up: 6 month(s) in-person, or earlier for new or changing lesions    Staff and scribe:     Scribe Disclosure:   I, ADARSH JULIETTE, am serving as a scribe; to document services personally performed by Aileen Billy PA-C -based on data collection and the provider's statements to me.     Provider Disclosure:  I agree with above History, Review of Systems, Physical exam and Plan.  I have reviewed the content of the documentation and have edited it as needed. I have personally performed the services documented here and the documentation accurately represents those services and the decisions I have made.      Electronically signed by:    All risks, benefits and alternatives were discussed with patient.  Patient is in agreement and understands the assessment and plan.  All questions were answered.    Aileen Billy PA-C, MPAS  [facility] [place]: Phone: [phone], Fax: [phone]  [facility]: Phone: [phone],  Fax: [phone]  [facility]: Phone: [phone], Fax: [phone]  ____________________________________________    CC: Eczema (Dry sensitive skin)      Reviewed patients past medical history and pertinent chart review prior to patient's visit today.     HPI:  Ms. Eve Jones is a 24 year old female who presents today as a new patient for dermatitis consultation. Noted she has had eczema her whole life. In the past she has used Elidel cream and betamethasone. Has noticed her skin is dry, itchy, and sensitive. Patient has really struggled with this. Reports it has dramatically affected her life. She does not do the same things as her peers as she knows her skin will react and she will be miserable for long periods of time. It has affected her mental  and social health. Itchy. Has hx of allergic rhinitis as well. Numerous allergies to foods as well as dogs and cats and environmental allergies.     Patient is otherwise feeling well, without additional concerns.    Labs:  N/A    Physical Exam:  Vitals: There were no vitals taken for this visit.  SKIN: Focused examination of areas noted below was performed.   - There are pink scaly patches and plaques on the antecubital fossae, neck.  - No other lesions of concern on areas examined.     Medications:  Current Outpatient Medications   Medication Sig Dispense Refill    amphetamine-dextroamphetamine (ADDERALL) 5 MG tablet Take 5 mg by mouth every morning       fexofenadine (ALLEGRA) 60 MG tablet Take 60 mg by mouth 2 times daily.      betamethasone dipropionate (DIPROSONE) 0.05 % external ointment Apply topically 2 times daily , 2 weeks on, 2 weeks off. (Patient not taking: Reported on 11/14/2024) 50 g 3    guanFACINE (INTUNIV) 2 MG TB24 24 hr tablet Take 2 mg by mouth every morning  (Patient not taking: Reported on 11/14/2024)      levonorgest-eth estrad 91-Day (SEASONIQUE) 0.15-0.03 &0.01 MG tablet Take 1 tablet by mouth daily (Patient not taking: Reported on 11/14/2024) 112 tablet 4    pimecrolimus (ELIDEL) 1 % external cream Apply topically 2 times daily. (Patient not taking: Reported on 4/17/2025) 100 g 5    sertraline (ZOLOFT) 100 MG tablet Take 100 mg by mouth every morning  (Patient not taking: Reported on 11/14/2024)       No current facility-administered medications for this visit.      Past Medical/Surgical History:   Patient Active Problem List   Diagnosis    ADHD (attention deficit hyperactivity disorder)    Eczema    intl adoption from [place] age 14 mo    Hymen abnormality     Past Medical History:   Diagnosis Date    Anxiety     Depression

## 2025-04-28 ENCOUNTER — VIRTUAL VISIT (OUTPATIENT)
Dept: PHARMACY | Facility: CLINIC | Age: 25
End: 2025-04-28
Attending: PHYSICIAN ASSISTANT
Payer: COMMERCIAL

## 2025-04-28 DIAGNOSIS — L30.9 ECZEMA: Primary | ICD-10-CM

## 2025-04-28 DIAGNOSIS — F90.9 ADHD (ATTENTION DEFICIT HYPERACTIVITY DISORDER): ICD-10-CM

## 2025-04-28 DIAGNOSIS — F32.A DEPRESSION: ICD-10-CM

## 2025-04-28 DIAGNOSIS — F41.9 ANXIETY: ICD-10-CM

## 2025-04-28 RX ORDER — BUPROPION HYDROCHLORIDE 150 MG/1
150 TABLET ORAL EVERY MORNING
COMMUNITY
Start: 2024-06-06

## 2025-04-28 NOTE — PROGRESS NOTES
Medication Therapy Management (MTM) Encounter    ASSESSMENT:                            Medication Adherence/Access: No issues identified.    Atopic dermatitis: Provided education on Dupixent (dupilumab) today including dosing, general administration, side effects (both common/serious), precautions, monitoring and time to efficacy. Encouraged patient to contact the Dermatology clinic in the event they have questions. Would benefit from starting Dupixent once it arrives.    ADHD, Depression, Anxiety: Defer to specialist (psych) managing this condition.         PLAN:                            Patient to start Dupixent.  Consider receiving the following vaccination(s): COVID-19 booster and tetanus booster (Tdap or similar).    Follow-up: Allergy 5/2/25; [facility] 8/18/25; MTM 7/28/25     SUBJECTIVE/OBJECTIVE:                          Eve Jones is a 24 year old female called for an initial visit. She was referred to me from Aileen Billy PA-C.      Reason for visit: Dupixent start.    Allergies/ADRs: Reviewed in chart.  Past Medical History: Reviewed in chart.  Tobacco: She reports that she has never smoked. She has never used smokeless tobacco.  Alcohol: Reviewed in chart.    Medication Adherence/Access: no issues reported.  Dupixent coverage - [place] approved thru 10/17/25.    Atopic dermatitis:   - Dupilumab (Dupixent) autoinjector: Inject 600 mg loading, then 300 mg subcutaneously every 14 days (initiation pending coverage).  - Fexofenadine 60 mg twice daily. (As needed)  - Pimecrolimus 1% cream twice daily. (New start)    04/28/25: Feels her symptoms are weather related and worse in summer seasons (notes allergy to grass). Avoids triggers as able but still has persisting symptoms. Symptoms are on wrists, forearms, neck, back of legs. Is unable to wear shorts in summer because where skin touches she gets hot/sweaty and breaks out. Symptoms can present anywhere but most bothersome areas are face, neck, lips, around eyes.  Notes she once touched a dog and then touched her face and her face flared so bad it swelled up and cracked/scabbed. Patient notes being adopted. Notes increased alcohol can cause flares.     POEM (Patient-Oriented Eczema Measure)    Over the last week, on how many days has your:    ...skin been itchy because of the eczema? Every day (4 pts)     Intensity of itching:  Moderate   ...sleep been disturbed because of the eczema?  1-2 days (1 pt)   ...skin been bleeding because of the eczema? No days (0 pts)   ...skin been weeping or oozing clear fluid because of the eczema?  No days (0 pts)   ...skin been cracked because of the eczema?  No days (0 pts)   ...skin been flaking off because of the eczema? Every day (4 pts)   ...skin felt dry or rough because of the eczema? Every day (4 pts)   Total POEM Score (max of 28; each question 0-4 points)   13      Specialist: Aileen Billy PA-C, Dermatology. Last visit on 4/17/25. The following was recommended:   # Atopic Dermatitis, chronic, flaring. Will start Dupixent given chronicity and lack of improvement to various topicals over time.   - Discussed the etiology of this condition as well as treatment and their side effects  - Discussed benefits and risks of medication including but not limited to risk of Headache, HSV infection, oral herpes, allergic reaction, local injection site reaction, nasopharyngitis/URI/bronchitis,  (cold symptoms) conjunctivitis, diarrhea, atopic derm worsening, flu, back pain, eye pruritus and/or case reports/rare cases of: Eosinophilia (transient), hypersensitivity reaction, keratitis, serum sickness, serum sickness-like reaction, ligament rupture, SCC,  urticaria, xerophthalmia, lymphoma, prostate cancer.  The patient was counseled to hold dose if he/she feels ill and to contact clinic. Vaccinations reviewed. The patient will not get live vaccination. Flu vaccination recommended.  The patient denies history of hypersensitivity of dupilumab.   -  Patient is not currently pregnant or breastfeeding.   - Before injection, remove dupilumab pre-filled syringe from the refrigerator and allow dupilumab to reach room temperature (45 minutes) without removing the needle cap.   - Start Dupilumab 600 mg once (given as two 300 mg injections), followed by 300 mg once every other week.   - Refilled her Elidel cream. Ok to use BID to flaring patches. Reviewed black box warning.   - Please start a daily moisturizer to your skin. Some good examples are: Cerave Cetaphil, Vanicream, Vaseline, Aquaphor. Recommended products IN A JAR. It is best to put it on after you get out of the shower.  - Follow up in 6 months to check for any abnormalities.   Follow-up: 6 month(s) in-person, or earlier for new or changing lesions    Previous treatment:   - Topical steroids  - OTC regimens    Immunization History   Covid-19 vaccine  Due to receive   Influenza (annual) End of season; okay to wait until next season   Tetanus/Tdap Due to receive   All patients on biologics should avoid live vaccines (varicella/VZV, intranasal influenza, MMR, or yellow fever vaccine (if traveling))      ADHD, Depression, Anxiety:  - Adderall 5 mg once daily.  - Bupropion  mg once daily. [Not currently taking].      Patient reports no current medication side effects. Patient is seeing a therapist; is seeing a psychiatrist. Stopped bupropion due to worsening depression after a couple of days on it and plans to follow-up with psych. Previously took sertraline and guanfacine for many years but decided it made her feel too numb and wanted a change.     Today's Vitals: There were no vitals taken for this visit.  ----------------  I spent 50 minutes with this patient today. All changes were made via collaborative practice agreement with Aileen Billy PA-C.     A summary of these recommendations was sent via [employer] and was sent via clinic portal.    Vanessa Monk, Pharm.D.  Medication Therapy Management  Pharmacist   [facility]    Telemedicine Visit Details  The patient's medications can be safely assessed via a telemedicine encounter.  Type of service:  Telephone visit  Originating Location (pt. Location): Home    Distant Location (provider location):  Off-site  Start Time: 3:30 PM  End Time: 4:20 PM     Medication Therapy Recommendations  No medication therapy recommendations to display

## 2025-04-29 NOTE — PATIENT INSTRUCTIONS
"Recommendations from today's MTM visit:                                                    MTM (medication therapy management) is a service provided by a clinical pharmacist designed to help you get the most of out of your medicines.      Patient to start Dupixent.  Consider receiving the following vaccination(s): COVID-19 booster and tetanus booster (Tdap or similar).     Follow-up: Allergy 5/2/25; Mariajose 8/18/25; MTM 7/28/25     It was great speaking with you today.  I value your experience and would be very thankful for your time in providing feedback in our clinic survey. In the next few days, you may receive an email or text message from [employer] with a link to a survey related to your  clinical pharmacist.\"     To schedule another MTM appointment, please call the clinic directly or you may call the MTM scheduling line at [phone].    My Clinical Pharmacist's contact information:                                                      Please feel free to contact me with any questions or concerns you have.      Vanessa Monk, Pharm.D.  Medication Therapy Management Pharmacist   [facility]  MTM Scheduling Line: [phone]    "

## 2025-05-05 DIAGNOSIS — L20.89 OTHER ATOPIC DERMATITIS: ICD-10-CM

## 2025-05-06 RX ORDER — DUPILUMAB 300 MG/2ML
INJECTION, SOLUTION SUBCUTANEOUS
Qty: 6 ML | Refills: 0 | OUTPATIENT
Start: 2025-05-06

## 2025-05-06 NOTE — TELEPHONE ENCOUNTER
Called pharmacy and verified we can disregard this refill request because patient has since filled maintenance dose prescription.     Vanessa Monk, [facility]

## 2025-05-10 ENCOUNTER — HEALTH MAINTENANCE LETTER (OUTPATIENT)
Age: 25
End: 2025-05-10

## 2025-08-18 ENCOUNTER — OFFICE VISIT (OUTPATIENT)
Dept: DERMATOLOGY | Facility: CLINIC | Age: 25
End: 2025-08-18
Payer: COMMERCIAL

## 2025-08-18 DIAGNOSIS — L20.89 OTHER ATOPIC DERMATITIS: ICD-10-CM

## 2025-08-18 PROCEDURE — 99214 OFFICE O/P EST MOD 30 MIN: CPT | Performed by: PHYSICIAN ASSISTANT

## (undated) DEVICE — NDL COUNTER 10CT

## (undated) DEVICE — LINEN TOWEL PACK X5 5464

## (undated) DEVICE — SU VICRYL 4-0 PS-2 18" UND J496H

## (undated) DEVICE — GLOVE PROTEXIS W/NEU-THERA 7.5  2D73TE75

## (undated) DEVICE — CATH INTERMITTENT CLEAN-CATH FEMALE 14FR 6" VINYL LF 420614

## (undated) DEVICE — ESU PENCIL W/HOLSTER E2350H

## (undated) DEVICE — PACK TVT HYSTEROSCOPY SMA15HYFSE

## (undated) DEVICE — NDL 25GA 1.5" 305127

## (undated) DEVICE — SYR 10ML FINGER CONTROL W/O NDL 309695

## (undated) DEVICE — SOL WATER IRRIG 1000ML BOTTLE 2F7114

## (undated) DEVICE — BLADE KNIFE SURG 15 371115

## (undated) RX ORDER — PROPOFOL 10 MG/ML
INJECTION, EMULSION INTRAVENOUS
Status: DISPENSED
Start: 2020-10-07

## (undated) RX ORDER — ACETAMINOPHEN 325 MG/1
TABLET ORAL
Status: DISPENSED
Start: 2020-10-07

## (undated) RX ORDER — FENTANYL CITRATE 50 UG/ML
INJECTION, SOLUTION INTRAMUSCULAR; INTRAVENOUS
Status: DISPENSED
Start: 2020-10-07

## (undated) RX ORDER — ONDANSETRON 2 MG/ML
INJECTION INTRAMUSCULAR; INTRAVENOUS
Status: DISPENSED
Start: 2020-10-07

## (undated) RX ORDER — DIPHENHYDRAMINE HYDROCHLORIDE 50 MG/ML
INJECTION INTRAMUSCULAR; INTRAVENOUS
Status: DISPENSED
Start: 2020-10-07

## (undated) RX ORDER — DEXAMETHASONE SODIUM PHOSPHATE 4 MG/ML
INJECTION, SOLUTION INTRA-ARTICULAR; INTRALESIONAL; INTRAMUSCULAR; INTRAVENOUS; SOFT TISSUE
Status: DISPENSED
Start: 2020-10-07

## (undated) RX ORDER — LIDOCAINE HYDROCHLORIDE 20 MG/ML
INJECTION, SOLUTION EPIDURAL; INFILTRATION; INTRACAUDAL; PERINEURAL
Status: DISPENSED
Start: 2020-10-07